# Patient Record
Sex: MALE | Race: WHITE | NOT HISPANIC OR LATINO | Employment: FULL TIME | URBAN - METROPOLITAN AREA
[De-identification: names, ages, dates, MRNs, and addresses within clinical notes are randomized per-mention and may not be internally consistent; named-entity substitution may affect disease eponyms.]

---

## 2020-02-28 ENCOUNTER — TELEPHONE (OUTPATIENT)
Dept: NEUROSURGERY | Facility: CLINIC | Age: 35
End: 2020-02-28

## 2020-02-28 NOTE — TELEPHONE ENCOUNTER
Patient called wanting to make appt for upper and low back pain that started one week ago.  He has not had any imaging.    Offered NP appt next week with T[facility] and patient declined- states that he needs to get in before that as he has been off work for a week.    Explained to him that is the soonest I have for a new patient with no imaging.    He states that he will call elsewhere and if cannot get in sooner he will call back

## 2020-03-03 NOTE — PROGRESS NOTES
Subjective   Patient ID: Martin Gandhi is a 34 y.o. male is here today as a self referral for neck and upper back pain that radiates into his right shoulder with tingling. He has had MDP and Norco PRN pain. He has had Chiropractic care.    Neck Pain    This is a new problem. The current episode started 1 to 4 weeks ago. The problem occurs constantly. The problem has been gradually worsening. The pain is associated with nothing. The quality of the pain is described as stabbing. The pain is at a severity of 8/10. The pain is severe. Associated symptoms include weakness. Pertinent negatives include no chest pain, fever, headaches, leg pain, numbness, syncope, tingling, trouble swallowing, visual change or weight loss. He has tried chiropractic manipulation, acetaminophen, NSAIDs and bed rest for the symptoms. The treatment provided no relief.       The following portions of the patient's history were reviewed and updated as appropriate: allergies, current medications, past family history, past medical history, past social history, past surgical history and problem list.    Review of Systems   Constitutional: Positive for activity change. Negative for fever and weight loss.   HENT: Negative.  Negative for trouble swallowing.    Eyes: Negative.    Respiratory: Negative for chest tightness and shortness of breath.    Cardiovascular: Negative for chest pain and syncope.   Gastrointestinal: Negative.    Endocrine: Negative.    Genitourinary: Negative.  Negative for difficulty urinating.   Musculoskeletal: Positive for arthralgias, back pain and neck pain.   Allergic/Immunologic: Negative.    Neurological: Positive for weakness. Negative for tingling, numbness and headaches.        Positive for tingling   Hematological: Negative.    Psychiatric/Behavioral: Negative.        Objective   Physical Exam   Constitutional: He is oriented to person, place, and time. He appears well-developed and well-nourished.   HENT:   Head:  Normocephalic and atraumatic.   Right Ear: External ear normal.   Left Ear: External ear normal.   Eyes: Pupils are equal, round, and reactive to light. Conjunctivae and EOM are normal. Right eye exhibits no discharge. Left eye exhibits no discharge.   Neck: Normal range of motion. Neck supple. No tracheal deviation present.   Cardiovascular: Intact distal pulses.   Pulmonary/Chest: Effort normal. No stridor. No respiratory distress.   Musculoskeletal: Normal range of motion. He exhibits no edema, tenderness or deformity.   Neurological: He is alert and oriented to person, place, and time. He has normal reflexes. He displays no atrophy, no tremor and normal reflexes. No cranial nerve deficit or sensory deficit. He exhibits normal muscle tone. He displays a negative Romberg sign. He displays no seizure activity. Coordination and gait normal.   No long tract signs    Positive Spurling's      Motor intact other than mild R bicep weakness, 5-/5   Skin: Skin is warm and dry.   Psychiatric: He has a normal mood and affect. His behavior is normal. Judgment and thought content normal.   Nursing note and vitals reviewed.    Neurologic Exam     Mental Status   Oriented to person, place, and time.     Cranial Nerves     CN III, IV, VI   Pupils are equal, round, and reactive to light.  Extraocular motions are normal.       Assessment/Plan   Independent Review of Radiographic Studies:      Medical Decision Making:    Mr. Gandhi came to see us today for new neck pain, R scapular and shoulder pain that began without accident or injury about 2wks ago.  He has never had similar pain. It is described as a burning stabbing type pain that radiates from the right side of the neck and to the right scapula and shoulder.  It worsens with any lateral rotation or bending of the neck or cervical flexion.  No exacerbation with any rotation or extension of the shoulder itself.  No significant numbness or tingling but he does feel a subjective  sense of weakness in the right arm and his exam does demonstrate very mild right bicep weakness.  He is right-handed.  The patient did see his chiropractor which has not helped.  He has also tried over-the-counter anti-inflammatories and was given a Medrol Dosepak and muscle relaxers, none of which offered relief.    Given the weakness and lack of improvement with steroids and anti-inflammatories I suggested a cervical MRI. He will f/u thereafter. Stay off work in the interim.     Martin was seen today for neck pain and back pain.    Diagnoses and all orders for this visit:    Cervical radiculopathy  -     MRI Cervical Spine Without Contrast; Future      Return for follow up after radiology test.

## 2020-03-06 ENCOUNTER — OFFICE VISIT (OUTPATIENT)
Dept: NEUROSURGERY | Facility: CLINIC | Age: 35
End: 2020-03-06

## 2020-03-06 VITALS — WEIGHT: 152 LBS | BODY MASS INDEX: 23.04 KG/M2 | HEIGHT: 68 IN

## 2020-03-06 DIAGNOSIS — M54.12 CERVICAL RADICULOPATHY: Primary | ICD-10-CM

## 2020-03-06 PROCEDURE — 99203 OFFICE O/P NEW LOW 30 MIN: CPT | Performed by: PHYSICIAN ASSISTANT

## 2020-03-06 RX ORDER — CHOLESTYRAMINE 4 G/9G
POWDER, FOR SUSPENSION ORAL
COMMUNITY
Start: 2020-02-11

## 2020-03-06 RX ORDER — FENOFIBRATE 160 MG/1
160 TABLET ORAL DAILY
COMMUNITY
Start: 2020-02-11

## 2020-03-06 RX ORDER — BUSPIRONE HYDROCHLORIDE 15 MG/1
TABLET ORAL
COMMUNITY
Start: 2020-02-03

## 2020-03-16 ENCOUNTER — APPOINTMENT (OUTPATIENT)
Dept: MRI IMAGING | Facility: HOSPITAL | Age: 35
End: 2020-03-16

## 2020-03-20 DIAGNOSIS — M54.12 CERVICAL RADICULOPATHY: ICD-10-CM
